# Patient Record
Sex: MALE | Race: WHITE | Employment: FULL TIME | URBAN - METROPOLITAN AREA
[De-identification: names, ages, dates, MRNs, and addresses within clinical notes are randomized per-mention and may not be internally consistent; named-entity substitution may affect disease eponyms.]

---

## 2017-04-20 ENCOUNTER — HOSPITAL ENCOUNTER (OUTPATIENT)
Dept: OTHER | Age: 42
Discharge: HOME | End: 2017-04-20
Attending: PREVENTIVE MEDICINE | Admitting: PREVENTIVE MEDICINE

## 2019-01-20 ENCOUNTER — APPOINTMENT (OUTPATIENT)
Dept: GENERAL RADIOLOGY | Age: 44
End: 2019-01-20
Payer: COMMERCIAL

## 2019-01-20 ENCOUNTER — HOSPITAL ENCOUNTER (EMERGENCY)
Age: 44
Discharge: HOME | End: 2019-01-20
Payer: COMMERCIAL

## 2019-01-20 VITALS
TEMPERATURE: 98 F | HEART RATE: 95 BPM | DIASTOLIC BLOOD PRESSURE: 90 MMHG | SYSTOLIC BLOOD PRESSURE: 153 MMHG | OXYGEN SATURATION: 99 % | RESPIRATION RATE: 14 BRPM

## 2019-01-20 DIAGNOSIS — S49.92XA INJURY OF LEFT UPPER EXTREMITY, INITIAL ENCOUNTER: Primary | ICD-10-CM

## 2019-01-20 PROCEDURE — 73060 X-RAY EXAM OF HUMERUS: CPT

## 2019-01-20 PROCEDURE — 99283 EMERGENCY DEPT VISIT LOW MDM: CPT

## 2019-01-20 RX ORDER — NAPROXEN 500 MG/1
500 TABLET ORAL 2 TIMES DAILY PRN
Qty: 20 TABLET | Refills: 0 | Status: SHIPPED | OUTPATIENT
Start: 2019-01-20 | End: 2022-09-11

## 2019-01-20 ASSESSMENT — PAIN SCALES - GENERAL: PAINLEVEL_OUTOF10: 4

## 2020-01-02 ENCOUNTER — HOSPITAL ENCOUNTER (EMERGENCY)
Age: 45
Discharge: HOME | End: 2020-01-02

## 2020-01-02 VITALS
TEMPERATURE: 98.2 F | DIASTOLIC BLOOD PRESSURE: 82 MMHG | BODY MASS INDEX: 23.7 KG/M2 | SYSTOLIC BLOOD PRESSURE: 139 MMHG | OXYGEN SATURATION: 97 % | HEART RATE: 104 BPM | HEIGHT: 69 IN | RESPIRATION RATE: 16 BRPM | WEIGHT: 160 LBS

## 2020-01-02 PROCEDURE — 99282 EMERGENCY DEPT VISIT SF MDM: CPT

## 2020-01-02 PROCEDURE — 90471 IMMUNIZATION ADMIN: CPT | Performed by: PHYSICIAN ASSISTANT

## 2020-01-02 PROCEDURE — 4500000027

## 2020-01-02 PROCEDURE — 90715 TDAP VACCINE 7 YRS/> IM: CPT | Performed by: PHYSICIAN ASSISTANT

## 2020-01-02 PROCEDURE — 6360000002 HC RX W HCPCS: Performed by: PHYSICIAN ASSISTANT

## 2020-01-02 RX ORDER — CEPHALEXIN 500 MG/1
500 CAPSULE ORAL 4 TIMES DAILY
Qty: 20 CAPSULE | Refills: 0 | Status: SHIPPED | OUTPATIENT
Start: 2020-01-02 | End: 2020-01-07

## 2020-01-02 RX ADMIN — TETANUS TOXOID, REDUCED DIPHTHERIA TOXOID AND ACELLULAR PERTUSSIS VACCINE, ADSORBED 0.5 ML: 5; 2.5; 8; 8; 2.5 SUSPENSION INTRAMUSCULAR at 18:49

## 2020-01-02 ASSESSMENT — PAIN DESCRIPTION - ORIENTATION: ORIENTATION: RIGHT

## 2020-01-02 ASSESSMENT — PAIN SCALES - GENERAL: PAINLEVEL_OUTOF10: 7

## 2020-01-02 ASSESSMENT — PAIN DESCRIPTION - DESCRIPTORS: DESCRIPTORS: THROBBING

## 2020-01-02 ASSESSMENT — PAIN DESCRIPTION - PAIN TYPE: TYPE: ACUTE PAIN

## 2020-01-02 ASSESSMENT — PAIN DESCRIPTION - LOCATION: LOCATION: FINGER (COMMENT WHICH ONE)

## 2020-01-02 NOTE — ED PROVIDER NOTES
EMERGENCY DEPARTMENT ENCOUNTER        PCP: No primary care provider on file. CHIEF COMPLAINT    Chief Complaint   Patient presents with    Laceration         This patient was not evaluated by the attending physician. I have independently evaluated this patient . HPI    Benton Le is a 38 y.o. male who presents with laceration to right thumb. Onset 1 hour prior to arrival.  Context is patient was cutting zip tie off a metal object when accidentally contacted dorsum of right thumb with sharp knife. There is associated pain and bleeding. Bleeding has stopped and pain does not radiate. No known aggravating or alleviating factors. No distal numbness, tingling, weakness, functional/motor deficit. Pt denies foreign body sensation. Unknown tetanus status. REVIEW OF SYSTEMS    General:   Denies symptoms preceding injury. Skin: + Laceration. SEE HPI  Musculoskeletal:  No distal numbness, tingling. No obvious tendon or motor deficits. Denies any other musculoskeletal injuries or skin trauma. All other review of systems are negative  See HPI and nursing notes for additional information     PAST MEDICAL & SURGICAL HISTORY    Past Medical History:   Diagnosis Date    Seasonal allergies      Past Surgical History:   Procedure Laterality Date    ARM SURGERY Left     distal bicep tear       CURRENT MEDICATIONS    Current Outpatient Rx   Medication Sig Dispense Refill    cephALEXin (KEFLEX) 500 MG capsule Take 1 capsule by mouth 4 times daily for 5 days 20 capsule 0    naproxen (NAPROSYN) 500 MG tablet Take 1 tablet by mouth 2 times daily as needed for Pain 20 tablet 0    traMADol (ULTRAM) 50 MG tablet Take 1 tablet by mouth every 6 hours as needed for Pain 15 tablet 0    methylPREDNISolone (MEDROL, YOJANA,) 4 MG tablet Medrol Dose yojana - Use as directed. #1 pack. (No refills) 1 kit 0    ketoconazole (NIZORAL) 2 % shampoo Apply topically daily as needed.  1 Bottle 1       ALLERGIES pool: JAYLENE Leblanc Allergies   Allergen Reactions    Pollen Extract     Tobacco        SOCIAL & FAMILY HISTORY    Social History     Socioeconomic History    Marital status: Single     Spouse name: None    Number of children: None    Years of education: None    Highest education level: None   Occupational History    None   Social Needs    Financial resource strain: None    Food insecurity:     Worry: None     Inability: None    Transportation needs:     Medical: None     Non-medical: None   Tobacco Use    Smoking status: Current Every Day Smoker     Packs/day: 0.50     Types: Cigarettes    Smokeless tobacco: Never Used   Substance and Sexual Activity    Alcohol use: No    Drug use: No    Sexual activity: None   Lifestyle    Physical activity:     Days per week: None     Minutes per session: None    Stress: None   Relationships    Social connections:     Talks on phone: None     Gets together: None     Attends Judaism service: None     Active member of club or organization: None     Attends meetings of clubs or organizations: None     Relationship status: None    Intimate partner violence:     Fear of current or ex partner: None     Emotionally abused: None     Physically abused: None     Forced sexual activity: None   Other Topics Concern    None   Social History Narrative    None     History reviewed. No pertinent family history. PHYSICAL EXAM    VITAL SIGNS: /82   Pulse 104   Temp 98.2 °F (36.8 °C) (Oral)   Resp 16   Ht 5' 9\" (1.753 m)   Wt 160 lb (72.6 kg)   SpO2 97%   BMI 23.63 kg/m²   Constitutional:  Well developed, Appears comfortable  HEENT:  Normocephalic, Atraumatic. PERRL, EOMI. Musculoskeletal:  No gross deformities. No motor deficits. Distal sensation and capillary refill intact. Vascular: Distal pulses and capillary refill intact. Integument:    Over the dorsum of the right thumb there is a laceration measuring approximately 2.2 centimeters in length.   This is flap type laceration   No obvious foreign body on initial inspection. No obvious tendon involvement    Distal sensation, capillary refill intact. Distal joints with full range of motion    See below for further details. Neurologic:  Awake and alert, normal flow of speech. CN 2-12 intact. Psychiatric: Cooperative, pleasant affect      ________________________________________________________________________       Procedure Note - Manuela Giraldo PA-C      Laceration Repair Procedure Note    Indication: Skin Laceration -right thumb    Procedure:   - Procedure explained, including risks and benefits explained to the patient who expressed understanding. All questions were answered. Verbal consent obtained. - The Wound was prepped and draped in the usual sterile fashion using Betadine and sterile saline.  - The wound is anesthetized using 2% lidocaine, approximately 1 ml  - Wound was explored to it's depth:    no foreign bodies. no compromise of neurovascular or tendon structures  - Wound was irrigated with copious amounts of sterile saline and mechanically debrided utilizing sterile gauze. - The laceration was Closed with 4-0 Prolene sutures, total number of 5,  simple interrupted and widely spaced to allow wound drainage and minimize risk of infection  - Hemostasis and good cosmesis was achieved. Blood loss minimal.  - The wound area was then dressed with Sterile nonstick dressing, sterile gauze, and tape. - Patient tolerated procedure well without complications. Post procedure exam of the affected region reveals distal sensation, motor, capillary refill, and pulses intact    Total repaired wound length: 0.2 cm  ________________________________________________________________________          ED COURSE & MEDICAL DECISION MAKING        I discussed possibility of infection, retained foreign body, tendon injury, nerve injury. Wound care instructions discussed with patient today.   Wound check in 2-3 days. Suture/Staple removal in 7 days. (discussed today). Patient placed in aluminum splint today to prevent flexion of thumb and subsequent tearing out of sutures. Return to emergency Department precautions were discussed in detail with patient who understands and agrees. Clinical  IMPRESSION    1. Laceration of right thumb without foreign body without damage to nail, initial encounter              Comment: Please note this report has been produced using speech recognition software and may contain errors related to that system including errors in grammar, punctuation, and spelling, as well as words and phrases that may be inappropriate. If there are any questions or concerns please feel free to contact the dictating provider for clarification.            Veronica Merrill  01/02/20 1955

## 2020-01-02 NOTE — DISCHARGE INSTR - COC
Continuity of Care Form    Patient Name: Vianca Collins   :  1975  MRN:  [id number]    Admit date:  (Not on file)  Discharge date:  ***    Code Status Order: No Order   Advance Directives:     Admitting Physician:  No admitting provider for patient encounter. PCP: No primary care provider on file. Discharging Nurse: [facility] Unit/Room#: No information available for this encounter. Discharging Unit Phone Number: ***    Emergency Contact:   Extended Emergency Contact Information  Primary Emergency Contact: Alejandra Vazquez  Address: [address] [place] Phone: [phone]  Relation: Parent    Past Surgical History:  Past Surgical History:   Procedure Laterality Date    ARM SURGERY Left     distal bicep tear       Immunization History: There is no immunization history on file for this patient. Active Problems: There is no problem list on file for this patient.       Isolation/Infection:   Isolation          No Isolation        Patient Infection Status     None to display          Nurse Assessment:  Last Vital Signs: /82   Pulse 104   Temp 98.2 °F (36.8 °C) (Oral)   Resp 16   Ht 5' 9\" (1.753 m)   Wt 160 lb (72.6 kg)   SpO2 97%   BMI 23.63 kg/m²     Last documented pain score (0-10 scale): Pain Level: 7  Last Weight:   Wt Readings from Last 1 Encounters:   20 160 lb (72.6 kg)     Mental Status:  {IP PT MENTAL STATUS:}    IV Access:  { CHARITO IV ACCESS:455751888}    Nursing Mobility/ADLs:  Walking   {CHP DME BCKW:285908539}  Transfer  {CHP DME VPDJ:988694142}  Bathing  {CHP DME AYUA:113285807}  Dressing  {CHP DME XNCP:899176562}  Toileting  {CHP DME FLJL:436952135}  Feeding  {CHP DME QXHY:357881890}  Med Admin  {CHP DME TPDS:590561574}  Med Delivery   { CHARITO MED Delivery:326620685}    Wound Care Documentation and Therapy:        Elimination:  Continence:   · Bowel: {YES / RC:06279}  · Bladder: {YES / DX:05218}  Urinary Catheter: {Urinary Catheter:159781412}   Colostomy/Ileostomy/Ileal Conduit: {YES / LA:76641}       Date of Last BM: ***  No intake or output data in the 24 hours ending 20 1705  No intake/output data recorded.     Safety Concerns:     [address] Safety Concerns:389405999}    Impairments/Disabilities:      [address] [facility] Impairments/Disabilities:686444135}    Nutrition Therapy:  Current Nutrition Therapy:   [address] [facility] Diet List:864863109}    Routes of Feeding: {CHP DME Other Feedings:762011505}  Liquids: {Slp liquid thickness:77528}  Daily Fluid Restriction: {CHP DME Yes amt example:631221740}  Last Modified Barium Swallow with Video (Video Swallowing Test): {Done Not Done OHLU:835301837}    Treatments at the Time of Hospital Discharge:   Respiratory Treatments: ***  Oxygen Therapy:  {Therapy; copd oxygen:64545}  Ventilator:    { CC Vent MNRU:267854266}    Rehab Therapies: {THERAPEUTIC INTERVENTION:0839218103}  Weight Bearing Status/Restrictions: [address]  Weight Bearin}  Other Medical Equipment (for information only, NOT a DME order):  {EQUIPMENT:329380160}  Other Treatments: ***    Patient's personal belongings (please select all that are sent with patient):  {CHP DME Belongings:158212425}    RN SIGNATURE:  {Esignature:082557824}    CASE MANAGEMENT/SOCIAL WORK SECTION    Inpatient Status Date: ***    Readmission Risk Assessment Score:  Readmission Risk              Risk of Unplanned Readmission:        0           Discharging to Facility/ Agency   · Name:   · Address:  · Phone:  · Fax:    Dialysis Facility (if applicable)   · Name:  · Address:  · Dialysis Schedule:  · Phone:  · Fax:    / signature: {Esignature:658030790}    PHYSICIAN SECTION    Prognosis: {Prognosis:0700182836}    Condition at Discharge: [address] Patient Condition:864438977}    Rehab Potential (if transferring to Rehab): {Prognosis:9866163608}    Recommended Labs or Other Treatments After Discharge:

## 2021-10-18 ENCOUNTER — HOSPITAL ENCOUNTER (EMERGENCY)
Age: 46
Discharge: HOME | End: 2021-10-18

## 2021-10-18 VITALS
OXYGEN SATURATION: 99 % | SYSTOLIC BLOOD PRESSURE: 140 MMHG | RESPIRATION RATE: 17 BRPM | DIASTOLIC BLOOD PRESSURE: 95 MMHG | TEMPERATURE: 98.7 F | WEIGHT: 160 LBS | HEIGHT: 69 IN | HEART RATE: 85 BPM | BODY MASS INDEX: 23.7 KG/M2

## 2021-10-18 DIAGNOSIS — J06.9 VIRAL UPPER RESPIRATORY TRACT INFECTION: Primary | ICD-10-CM

## 2021-10-18 PROCEDURE — U0003 INFECTIOUS AGENT DETECTION BY NUCLEIC ACID (DNA OR RNA); SEVERE ACUTE RESPIRATORY SYNDROME CORONAVIRUS 2 (SARS-COV-2) (CORONAVIRUS DISEASE [COVID-19]), AMPLIFIED PROBE TECHNIQUE, MAKING USE OF HIGH THROUGHPUT TECHNOLOGIES AS DESCRIBED BY CMS-2020-01-R: HCPCS

## 2021-10-18 PROCEDURE — U0005 INFEC AGEN DETEC AMPLI PROBE: HCPCS

## 2021-10-18 PROCEDURE — 99283 EMERGENCY DEPT VISIT LOW MDM: CPT | Performed by: NURSE PRACTITIONER

## 2021-10-18 RX ORDER — GUAIFENESIN 600 MG/1
600 TABLET, EXTENDED RELEASE ORAL 2 TIMES DAILY
Qty: 14 TABLET | Refills: 0 | Status: SHIPPED | OUTPATIENT
Start: 2021-10-18 | End: 2021-10-25

## 2021-10-18 ASSESSMENT — PAIN SCALES - GENERAL
PAINLEVEL_OUTOF10: 3
PAINLEVEL_OUTOF10: 9

## 2021-10-18 ASSESSMENT — PAIN DESCRIPTION - PAIN TYPE
TYPE: ACUTE PAIN
TYPE: ACUTE PAIN

## 2021-10-18 ASSESSMENT — PAIN DESCRIPTION - LOCATION: LOCATION: NECK

## 2021-10-18 ASSESSMENT — PAIN DESCRIPTION - DESCRIPTORS: DESCRIPTORS: ACHING

## 2021-10-18 NOTE — ED PROVIDER NOTES
EMERGENCY DEPARTMENT ENCOUNTER      PCP: No primary care provider on file. CHIEF COMPLAINT    Chief Complaint   Patient presents with    Nasal Congestion         This patient was not evaluated by the attending physician. I have independently evaluated this patient . HPI    Monroe Breen is a 40 y.o. male who presents with complaints of nasal congestion, headache, and body aches for the past couple of days. Patient states initially he thought it was allergies, however he has been taking Benadryl with no relief. He complains of last headache and body aches he rates as 9/10, aching, and constant. He states he is a lot of sinus drainage and pressure. Nothing has alleviated symptoms, leaning forward exacerbates his symptoms. He denies any cough, shortness of breath, chest pain, nausea, vomiting, chest pain, or other complaints. REVIEW OF SYSTEMS    Constitutional:  Denies fever, chills  HEENT:  See above  Cardiovascular:  Denies chest pain, palpitations.   Respiratory:  Denies shortness of breath or wheezes  GI:  Denies abdominal pain, vomiting, or diarrhea   Neurologic:  Denies confusion, light headedness, dizziness, or syncope   Skin:  No rash    All other review of systems are negative  See HPI and nursing notes for additional information       PAST MEDICAL AND SURGICAL HISTORY    Past Medical History:   Diagnosis Date    Seasonal allergies      Past Surgical History:   Procedure Laterality Date    ARM SURGERY Left     distal bicep tear       CURRENT MEDICATIONS    Current Outpatient Rx   Medication Sig Dispense Refill    Pseudoephedrine-DM-GG 60- MG TABS Take 1 tablet by mouth every 6 hours as needed (congestion) 56 tablet 0    guaiFENesin (MUCINEX) 600 MG extended release tablet Take 1 tablet by mouth 2 times daily for 7 days 14 tablet 0    naproxen (NAPROSYN) 500 MG tablet Take 1 tablet by mouth 2 times daily as needed for Pain 20 tablet 0    traMADol (ULTRAM) 50 MG tablet Take 1 tablet by mouth every 6 hours as needed for Pain 15 tablet 0    methylPREDNISolone (MEDROL, YOJANA,) 4 MG tablet Medrol Dose yojana - Use as directed. #1 pack. (No refills) 1 kit 0    ketoconazole (NIZORAL) 2 % shampoo Apply topically daily as needed. 1 Bottle 1       ALLERGIES    Allergies   Allergen Reactions    Pollen Extract     Tobacco        FAMILY AND SOCIAL HISTORY    History reviewed. No pertinent family history. Social History     Socioeconomic History    Marital status: Single     Spouse name: None    Number of children: None    Years of education: None    Highest education level: None   Occupational History    None   Tobacco Use    Smoking status: Current Every Day Smoker     Packs/day: 0.50     Types: Cigarettes    Smokeless tobacco: Never Used   Vaping Use    Vaping Use: Never used   Substance and Sexual Activity    Alcohol use: No    Drug use: No    Sexual activity: None   Other Topics Concern    None   Social History Narrative    None     Social Determinants of Health     Financial Resource Strain:     Difficulty of Paying Living Expenses:    Food Insecurity:     Worried About Running Out of Food in the Last Year:     Ran Out of Food in the Last Year:    Transportation Needs:     Lack of Transportation (Medical):      Lack of Transportation (Non-Medical):    Physical Activity:     Days of Exercise per Week:     Minutes of Exercise per Session:    Stress:     Feeling of Stress :    Social Connections:     Frequency of Communication with Friends and Family:     Frequency of Social Gatherings with Friends and Family:     Attends Roman [employer] Services:     Active Member of Clubs or Organizations:     Attends Club or Organization Meetings:     Marital Status:    Intimate Partner Violence:     Fear of Current or Ex-Partner:     Emotionally Abused:     Physically Abused:     Sexually Abused:        PHYSICAL EXAM    VITAL SIGNS: BP (!) 126/102   Pulse 108   Temp 98.8 °F (37.1 °C) (Oral)   Resp 20   Ht 5' 9\" (1.753 m)   Wt 160 lb (72.6 kg)   SpO2 100%   BMI 23.63 kg/m²   Constitutional:  Well developed, well nourished, no acute distress, non-toxic appearance   Eyes: Conjunctiva normal, sclera non-icteric  HEENT:     - Normocephalic, atraumatic   - PERRL, EOM intact. Conjunctiva normal    -  Frontal/Maxillary sinuses NONtender to percussion.   - External auditory canals  clear   - Nasal passages with mildly erythematous and edematous turbinates. No massess.   - Oropharynx mildly erythematous without tonsillar hypertrophy or exudate. Neck/Lymphatics:    - supple, no JVD, no swollen nodes     Respiratory:     Nonlabored breathing - No retractions, no accessory muscle use   Clear to auscultation bilateral lung fields, no wheezes/rales/rhonchi. Cardiovascular:  Normal rate, normal rhythm   GI:  Soft, no abdominal tenderness, no guarding. Musculoskeletal:  No obvious deficits, no edema   Integument:  Skin pink, warm, dry, intact           ED COURSE & MEDICAL DECISION MAKING        60-year-old male presents emergency department with complaints of nasal congestion, sinus pressure and pain, generalized headache, and body aches for the past couple of days. Covid test obtained and pending. I discussed the likely viral etiology of patient's symptoms with Pt today and recommend symptomatic treatment with increase fluids, rest.  I provided symptomatic treatment. Given a prescription for Mucinex. Instructed to quarantine until he is the result of his Covid test.  If positive, he was instructed to quarantine for at least 10 days. If feeling better at 10 days he may come out of quarantine. Instructed to follow-up with a primary care provider within a week and have them recheck his blood pressure was elevated during today's stay. I recommend followup with primary care provider in 2-3 days for recheck.      Patient agrees to return emergency department if symptoms worsen or any new symptoms develop. Vital signs and nursing notes reviewed during ED course. Differential Diagnoses:  Acute Bronchitis, Pneumonia, Airway Obstruction, Upper Respiratory Viral infection, Sinusitis, Pharyngitis, [place]-Tonsillar Abscess,        Clinical  IMPRESSION    1. Viral upper respiratory tract infection          Comment: Please note this report has been produced using speech recognition software and may contain errors related to that system including errors in grammar, punctuation, and spelling, as well as words and phrases that may be inappropriate. If there are any questions or concerns please feel free to contact the dictating provider for clarification.       CAL Fink - CNP  10/18/21 2001

## 2021-10-18 NOTE — Clinical Note
Norma Peace was seen and treated in our emergency department on 10/18/2021. He may return to work on 10/21/2021. Or until results of Covid test.  If positive, the patient is not able to return to work until 10 days after onset of symptoms if symptoms improving. If you have any questions or concerns, please don't hesitate to call.       Addis Peoples, CAL - CNP

## 2021-10-19 ENCOUNTER — CARE COORDINATION (OUTPATIENT)
Dept: CARE COORDINATION | Age: 46
End: 2021-10-19

## 2021-10-19 LAB
SARS-COV-2: NOT DETECTED
SOURCE: NORMAL

## 2021-10-19 NOTE — CARE COORDINATION
Attempted to reach patient for [facility] ER follow up. No answer to phone. Message left with ACM contact information and request for call back. COVID-19 :  Not detected. No further follow up planned.

## 2022-09-11 ENCOUNTER — HOSPITAL ENCOUNTER (EMERGENCY)
Age: 47
Discharge: HOME | End: 2022-09-11

## 2022-09-11 ENCOUNTER — APPOINTMENT (OUTPATIENT)
Dept: GENERAL RADIOLOGY | Age: 47
End: 2022-09-11

## 2022-09-11 VITALS
DIASTOLIC BLOOD PRESSURE: 96 MMHG | OXYGEN SATURATION: 100 % | HEIGHT: 69 IN | HEART RATE: 88 BPM | SYSTOLIC BLOOD PRESSURE: 134 MMHG | WEIGHT: 160 LBS | RESPIRATION RATE: 18 BRPM | TEMPERATURE: 98 F | BODY MASS INDEX: 23.7 KG/M2

## 2022-09-11 DIAGNOSIS — R09.89 GLOBUS SENSATION: ICD-10-CM

## 2022-09-11 DIAGNOSIS — J02.9 EXUDATIVE PHARYNGITIS: Primary | ICD-10-CM

## 2022-09-11 DIAGNOSIS — R53.83 FATIGUE, UNSPECIFIED TYPE: ICD-10-CM

## 2022-09-11 LAB
ALBUMIN SERPL-MCNC: 4.2 GM/DL (ref 3.4–5)
ALP BLD-CCNC: 92 IU/L (ref 40–129)
ALT SERPL-CCNC: 15 U/L (ref 10–40)
ANION GAP SERPL CALCULATED.3IONS-SCNC: 7 MMOL/L (ref 4–16)
AST SERPL-CCNC: 21 IU/L (ref 15–37)
BACTERIA: NEGATIVE /HPF
BASOPHILS ABSOLUTE: 0.1 K/CU MM
BASOPHILS RELATIVE PERCENT: 1 % (ref 0–1)
BILIRUB SERPL-MCNC: 1.1 MG/DL (ref 0–1)
BILIRUBIN URINE: NEGATIVE MG/DL
BLOOD, URINE: NEGATIVE
BUN BLDV-MCNC: 13 MG/DL (ref 6–23)
CALCIUM SERPL-MCNC: 8.8 MG/DL (ref 8.3–10.6)
CHLORIDE BLD-SCNC: 104 MMOL/L (ref 99–110)
CLARITY: CLEAR
CO2: 28 MMOL/L (ref 21–32)
COLOR: YELLOW
CREAT SERPL-MCNC: 0.8 MG/DL (ref 0.9–1.3)
DIFFERENTIAL TYPE: ABNORMAL
EOSINOPHILS ABSOLUTE: 0.3 K/CU MM
EOSINOPHILS RELATIVE PERCENT: 2.5 % (ref 0–3)
GFR AFRICAN AMERICAN: >60 ML/MIN/1.73M2
GFR NON-AFRICAN AMERICAN: >60 ML/MIN/1.73M2
GLUCOSE BLD-MCNC: 100 MG/DL
GLUCOSE BLD-MCNC: 100 MG/DL (ref 70–99)
GLUCOSE BLD-MCNC: 110 MG/DL (ref 70–99)
GLUCOSE, URINE: NEGATIVE MG/DL
HCT VFR BLD CALC: 42.8 % (ref 42–52)
HEMOGLOBIN: 14 GM/DL (ref 13.5–18)
HETEROPHILE ANTIBODIES: NEGATIVE
IMMATURE NEUTROPHIL %: 0.4 % (ref 0–0.43)
KETONES, URINE: NEGATIVE MG/DL
LEUKOCYTE ESTERASE, URINE: NEGATIVE
LYMPHOCYTES ABSOLUTE: 1.6 K/CU MM
LYMPHOCYTES RELATIVE PERCENT: 13.1 % (ref 24–44)
MCH RBC QN AUTO: 31.7 PG (ref 27–31)
MCHC RBC AUTO-ENTMCNC: 32.7 % (ref 32–36)
MCV RBC AUTO: 96.8 FL (ref 78–100)
MONOCYTES ABSOLUTE: 0.9 K/CU MM
MONOCYTES RELATIVE PERCENT: 7.3 % (ref 0–4)
MUCUS: ABNORMAL HPF
NITRITE URINE, QUANTITATIVE: NEGATIVE
NUCLEATED RBC %: 0 %
PDW BLD-RTO: 13.1 % (ref 11.7–14.9)
PH, URINE: 6 (ref 5–8)
PLATELET # BLD: 252 K/CU MM (ref 140–440)
PMV BLD AUTO: 10.1 FL (ref 7.5–11.1)
POTASSIUM SERPL-SCNC: 3.8 MMOL/L (ref 3.5–5.1)
PROTEIN UA: NEGATIVE MG/DL
RBC # BLD: 4.42 M/CU MM (ref 4.6–6.2)
RBC URINE: 2 /HPF (ref 0–3)
SARS-COV-2, NAAT: NOT DETECTED
SEGMENTED NEUTROPHILS ABSOLUTE COUNT: 9.3 K/CU MM
SEGMENTED NEUTROPHILS RELATIVE PERCENT: 75.7 % (ref 36–66)
SODIUM BLD-SCNC: 139 MMOL/L (ref 135–145)
SOURCE: NORMAL
SPECIFIC GRAVITY UA: >1.03 (ref 1–1.03)
SQUAMOUS EPITHELIAL: <1 /HPF
TOTAL IMMATURE NEUTOROPHIL: 0.05 K/CU MM
TOTAL NUCLEATED RBC: 0 K/CU MM
TOTAL PROTEIN: 6.4 GM/DL (ref 6.4–8.2)
TRICHOMONAS: ABNORMAL /HPF
TSH HIGH SENSITIVITY: 0.93 UIU/ML (ref 0.27–4.2)
UROBILINOGEN, URINE: 0.2 MG/DL (ref 0.2–1)
WBC # BLD: 12.3 K/CU MM (ref 4–10.5)
WBC UA: 1 /HPF (ref 0–2)

## 2022-09-11 PROCEDURE — 86318 IA INFECTIOUS AGENT ANTIBODY: CPT

## 2022-09-11 PROCEDURE — 96375 TX/PRO/DX INJ NEW DRUG ADDON: CPT

## 2022-09-11 PROCEDURE — 93005 ELECTROCARDIOGRAM TRACING: CPT | Performed by: EMERGENCY MEDICINE

## 2022-09-11 PROCEDURE — 6370000000 HC RX 637 (ALT 250 FOR IP): Performed by: PHYSICIAN ASSISTANT

## 2022-09-11 PROCEDURE — 87635 SARS-COV-2 COVID-19 AMP PRB: CPT

## 2022-09-11 PROCEDURE — 82962 GLUCOSE BLOOD TEST: CPT

## 2022-09-11 PROCEDURE — 81001 URINALYSIS AUTO W/SCOPE: CPT

## 2022-09-11 PROCEDURE — 87077 CULTURE AEROBIC IDENTIFY: CPT

## 2022-09-11 PROCEDURE — 84443 ASSAY THYROID STIM HORMONE: CPT

## 2022-09-11 PROCEDURE — 2500000003 HC RX 250 WO HCPCS: Performed by: PHYSICIAN ASSISTANT

## 2022-09-11 PROCEDURE — 71045 X-RAY EXAM CHEST 1 VIEW: CPT

## 2022-09-11 PROCEDURE — 85025 COMPLETE CBC W/AUTO DIFF WBC: CPT

## 2022-09-11 PROCEDURE — 99285 EMERGENCY DEPT VISIT HI MDM: CPT

## 2022-09-11 PROCEDURE — 87430 STREP A AG IA: CPT

## 2022-09-11 PROCEDURE — 96374 THER/PROPH/DIAG INJ IV PUSH: CPT

## 2022-09-11 PROCEDURE — 70360 X-RAY EXAM OF NECK: CPT

## 2022-09-11 PROCEDURE — A4216 STERILE WATER/SALINE, 10 ML: HCPCS | Performed by: PHYSICIAN ASSISTANT

## 2022-09-11 PROCEDURE — 80053 COMPREHEN METABOLIC PANEL: CPT

## 2022-09-11 PROCEDURE — 6360000002 HC RX W HCPCS: Performed by: PHYSICIAN ASSISTANT

## 2022-09-11 PROCEDURE — 2580000003 HC RX 258: Performed by: PHYSICIAN ASSISTANT

## 2022-09-11 PROCEDURE — 87081 CULTURE SCREEN ONLY: CPT

## 2022-09-11 RX ORDER — 0.9 % SODIUM CHLORIDE 0.9 %
1000 INTRAVENOUS SOLUTION INTRAVENOUS ONCE
Status: COMPLETED | OUTPATIENT
Start: 2022-09-11 | End: 2022-09-11

## 2022-09-11 RX ORDER — DEXAMETHASONE SODIUM PHOSPHATE 10 MG/ML
8 INJECTION, SOLUTION INTRAMUSCULAR; INTRAVENOUS ONCE
Status: COMPLETED | OUTPATIENT
Start: 2022-09-11 | End: 2022-09-11

## 2022-09-11 RX ORDER — KETOROLAC TROMETHAMINE 30 MG/ML
30 INJECTION, SOLUTION INTRAMUSCULAR; INTRAVENOUS ONCE
Status: COMPLETED | OUTPATIENT
Start: 2022-09-11 | End: 2022-09-11

## 2022-09-11 RX ORDER — LIDOCAINE HYDROCHLORIDE 20 MG/ML
15 SOLUTION OROPHARYNGEAL ONCE
Status: COMPLETED | OUTPATIENT
Start: 2022-09-11 | End: 2022-09-11

## 2022-09-11 RX ORDER — NAPROXEN 500 MG/1
500 TABLET ORAL 2 TIMES DAILY PRN
Qty: 20 TABLET | Refills: 0 | Status: SHIPPED | OUTPATIENT
Start: 2022-09-11 | End: 2022-09-21

## 2022-09-11 RX ORDER — BENZOCAINE AND MENTHOL, UNSPECIFIED FORM 15; 2.3 MG/1; MG/1
1 LOZENGE ORAL
Qty: 30 LOZENGE | Refills: 0 | Status: SHIPPED | OUTPATIENT
Start: 2022-09-11

## 2022-09-11 RX ADMIN — DEXAMETHASONE SODIUM PHOSPHATE 8 MG: 10 INJECTION, SOLUTION INTRAMUSCULAR; INTRAVENOUS at 20:57

## 2022-09-11 RX ADMIN — SODIUM CHLORIDE 1000 ML: 9 INJECTION, SOLUTION INTRAVENOUS at 17:57

## 2022-09-11 RX ADMIN — FAMOTIDINE 20 MG: 10 INJECTION, SOLUTION INTRAVENOUS at 17:56

## 2022-09-11 RX ADMIN — Medication 15 ML: at 17:57

## 2022-09-11 RX ADMIN — KETOROLAC TROMETHAMINE 30 MG: 30 INJECTION, SOLUTION INTRAMUSCULAR; INTRAVENOUS at 17:57

## 2022-09-11 ASSESSMENT — PAIN DESCRIPTION - PAIN TYPE: TYPE: ACUTE PAIN

## 2022-09-11 ASSESSMENT — PAIN SCALES - GENERAL
PAINLEVEL_OUTOF10: 3
PAINLEVEL_OUTOF10: 10

## 2022-09-11 ASSESSMENT — PAIN DESCRIPTION - FREQUENCY: FREQUENCY: INTERMITTENT

## 2022-09-11 ASSESSMENT — PAIN DESCRIPTION - LOCATION: LOCATION: BACK

## 2022-09-11 ASSESSMENT — PAIN DESCRIPTION - ONSET: ONSET: PROGRESSIVE

## 2022-09-11 NOTE — ED PROVIDER NOTES
bicep tear       CURRENT MEDICATIONS    Current Outpatient Rx   Medication Sig Dispense Refill    Benzocaine-Menthol (CEPACOL) 15-2.3 MG LOZG Take 1 lozenge by mouth every 3 hours as needed (pain) 30 lozenge 0    naproxen (NAPROSYN) 500 MG tablet Take 1 tablet by mouth 2 times daily as needed for Pain 20 tablet 0    traMADol (ULTRAM) 50 MG tablet Take 1 tablet by mouth every 6 hours as needed for Pain 15 tablet 0    methylPREDNISolone (MEDROL, YOJANA,) 4 MG tablet Medrol Dose yojana - Use as directed. #1 pack. (No refills) 1 kit 0    ketoconazole (NIZORAL) 2 % shampoo Apply topically daily as needed. 1 Bottle 1       ALLERGIES    Allergies   Allergen Reactions    Pollen Extract     Tobacco        FAMILY AND SOCIAL HISTORY    History reviewed. No pertinent family history. Social History     Socioeconomic History    Marital status: Single     Spouse name: None    Number of children: None    Years of education: None    Highest education level: None   Tobacco Use    Smoking status: Every Day     Packs/day: 0.50     Types: Cigarettes    Smokeless tobacco: Never   Vaping Use    Vaping Use: Never used   Substance and Sexual Activity    Alcohol use: No    Drug use: No       PHYSICAL EXAM    VITAL SIGNS: BP (!) 134/96   Pulse 88   Temp 98 °F (36.7 °C)   Resp 18   Ht 5' 9\" (1.753 m)   Wt 160 lb (72.6 kg)   SpO2 100%   BMI 23.63 kg/m²   Constitutional:  Well developed, well nourished, no acute distress, non-toxic appearance   Eyes:    -PERRL, EOM intact. Conjunctiva normal, sclera non-icteric  HEENT:     - Normocephalic, atraumatic     -  Frontal/Maxillary sinuses NONtender to percussion.   -Normal external ears without tenderness on pinna/tragus manipulation. No mastoid tenderness   - Oropharynx is patent, mildly injected, slightly edematous but midline uvula. Noted white exudates to the base of the uvula as well as tongue surface without tonsillar hypertrophy. No trismus. Tolerating secretions. No stridor.   No hot potato voice. No sublingual or submandibular swelling or jaw tenderness. Neck/Lymphatics:    - supple, no JVD, no swollen nodes   -Trachea is midline. No nuchal rigidity. No tenderness to palpation or fullness to the anterior neck, no palpable nodules to the thyroid gland. Respiratory:  Non labored breathing. Clear to auscultation bilateral lung fields, no wheezes/rales/rhonchi. No retractions, no accessory muscle use  Cardiovascular:  Normal rate, normal rhythm   GI:  Soft, no abdominal tenderness, no guarding. Musculoskeletal:  No obvious deficits, no edema   Integument:  Skin pink, warn, dry, intact.  No rash      LABS:  Results for orders placed or performed during the hospital encounter of 09/11/22   COVID-19, Rapid    Specimen: Nasopharyngeal   Result Value Ref Range    Source THROAT     SARS-CoV-2, NAAT NOT DETECTED NOT DETECTED   Strep Screen Group A Throat    Specimen: Throat   Result Value Ref Range    Specimen THROAT     Special Requests NONE     Strep A Direct Screen NEGATIVE    CBC with Auto Differential   Result Value Ref Range    WBC 12.3 (H) 4.0 - 10.5 K/CU MM    RBC 4.42 (L) 4.6 - 6.2 M/CU MM    Hemoglobin 14.0 13.5 - 18.0 GM/DL    Hematocrit 42.8 42 - 52 %    MCV 96.8 78 - 100 FL    MCH 31.7 (H) 27 - 31 PG    MCHC 32.7 32.0 - 36.0 %    RDW 13.1 11.7 - 14.9 %    Platelets 619 676 - 252 K/CU MM    MPV 10.1 7.5 - 11.1 FL    Differential Type AUTOMATED DIFFERENTIAL     Segs Relative 75.7 (H) 36 - 66 %    Lymphocytes % 13.1 (L) 24 - 44 %    Monocytes % 7.3 (H) 0 - 4 %    Eosinophils % 2.5 0 - 3 %    Basophils % 1.0 0 - 1 %    Segs Absolute 9.3 K/CU MM    Lymphocytes Absolute 1.6 K/CU MM    Monocytes Absolute 0.9 K/CU MM    Eosinophils Absolute 0.3 K/CU MM    Basophils Absolute 0.1 K/CU MM    Nucleated RBC % 0.0 %    Total Nucleated RBC 0.0 K/CU MM    Total Immature Neutrophil 0.05 K/CU MM    Immature Neutrophil % 0.4 0 - 0.43 %   Comprehensive Metabolic Panel   Result Value Ref Range    Sodium 139 135 - 145 MMOL/L    Potassium 3.8 3.5 - 5.1 MMOL/L    Chloride 104 99 - 110 mMol/L    CO2 28 21 - 32 MMOL/L    BUN 13 6 - 23 MG/DL    Creatinine 0.8 (L) 0.9 - 1.3 MG/DL    Glucose 110 (H) 70 - 99 MG/DL    Calcium 8.8 8.3 - 10.6 MG/DL    Albumin 4.2 3.4 - 5.0 GM/DL    Total Protein 6.4 6.4 - 8.2 GM/DL    Total Bilirubin 1.1 (H) 0.0 - 1.0 MG/DL    ALT 15 10 - 40 U/L    AST 21 15 - 37 IU/L    Alkaline Phosphatase 92 40 - 129 IU/L    GFR Non-African American >60 >60 mL/min/1.73m2    GFR African American >60 >60 mL/min/1.73m2    Anion Gap 7 4 - 16   TSH   Result Value Ref Range    TSH, High Sensitivity 0.931 0.270 - 4.20 uIu/ml   Urinalysis   Result Value Ref Range    Color, UA YELLOW YELLOW    Clarity, UA CLEAR CLEAR    Glucose, Urine NEGATIVE NEGATIVE MG/DL    Bilirubin Urine NEGATIVE NEGATIVE MG/DL    Ketones, Urine NEGATIVE NEGATIVE MG/DL    Specific Gravity, UA >1.030 1.001 - 1.035    Blood, Urine NEGATIVE NEGATIVE    pH, Urine 6.0 5.0 - 8.0    Protein, UA NEGATIVE NEGATIVE MG/DL    Urobilinogen, Urine 0.2 0.2 - 1.0 MG/DL    Nitrite Urine, Quantitative NEGATIVE NEGATIVE    Leukocyte Esterase, Urine NEGATIVE NEGATIVE    RBC, UA 2 0 - 3 /HPF    WBC, UA 1 0 - 2 /HPF    Bacteria, UA NEGATIVE NEGATIVE /HPF    Squam Epithel, UA <1 /HPF    Mucus, UA MANY (A) NEGATIVE HPF    Trichomonas, UA NONE SEEN NONE SEEN /HPF   Mononucleosis Screen   Result Value Ref Range    Monospot NEGATIVE NEGATIVE   POC Blood Glucose   Result Value Ref Range    Glucose 100 mg/dL   POCT Glucose   Result Value Ref Range    POC Glucose 100 (H) 70 - 99 MG/DL       RADIOLOGY/PROCEDURES        XR NECK SOFT TISSUE (Final result)  Result time 09/11/22 19:07:20  Final result by Arvin Dennison MD (09/11/22 19:07:20)                Impression:    1. Limited radiographs of the neck soft tissues due to obliquity on the   lateral view. No focal abnormality. 2. Chest demonstrates no acute abnormality.              Narrative:    EXAMINATION:   ONE XRAY VIEW OF THE CHEST; TWO XRAY VIEWS OF THE NECK SOFT TISSUES     9/11/2022 6:04 pm     COMPARISON:   Chest radiograph 2012. HISTORY:   ORDERING SYSTEM PROVIDED HISTORY: cough, fatigue   TECHNOLOGIST PROVIDED HISTORY:   Reason for exam:->cough, fatigue   Reason for Exam: cough fatique     FINDINGS:   Neck soft tissues: Two views provided. Limited exam as the lateral view due   is oblique. The neck soft tissues demonstrate normal symmetry. Normal   pharyngeal air column. Cervical spine degenerative changes with normal   alignment. Chest: Single view provided. The mediastinal, cardiac, and hilar silhouettes   are normal.  Normal lung volumes with no interstitial edema or acute   consolidation. No pulmonary mass or pneumothorax. No free subdiaphragmatic   air. XR CHEST PORTABLE (Final result)  Result time 09/11/22 19:07:20  Final result by Amandeep Jacobo MD (09/11/22 19:07:20)                Impression:    1. Limited radiographs of the neck soft tissues due to obliquity on the   lateral view. No focal abnormality. 2. Chest demonstrates no acute abnormality. Narrative:    EXAMINATION:   ONE XRAY VIEW OF THE CHEST; TWO XRAY VIEWS OF THE NECK SOFT TISSUES     9/11/2022 6:04 pm     COMPARISON:   Chest radiograph 2012. HISTORY:   ORDERING SYSTEM PROVIDED HISTORY: cough, fatigue   TECHNOLOGIST PROVIDED HISTORY:   Reason for exam:->cough, fatigue   Reason for Exam: cough fatique     FINDINGS:   Neck soft tissues: Two views provided. Limited exam as the lateral view due   is oblique. The neck soft tissues demonstrate normal symmetry. Normal   pharyngeal air column. Cervical spine degenerative changes with normal   alignment. Chest: Single view provided. The mediastinal, cardiac, and hilar silhouettes   are normal.  Normal lung volumes with no interstitial edema or acute   consolidation. No pulmonary mass or pneumothorax. No free subdiaphragmatic   air. EKG Interpretation  Please see ED physician's note - Dr. Freedom Ureña - for EKG interpretation    ED COURSE & MEDICAL DECISION MAKING       Vital signs and nursing notes reviewed during ED course. I have independently evaluated this patient . Supervising MD - Dr. Aneesh Schmidt - present in the Emergency Department, available for consultation, throughout entirety of  patient care. All pertinent Lab data and radiographic results reviewed with patient at bedside. The patient and/or the family were informed of the results of any tests/labs/imaging, the treatment plan, and time was allotted to answer questions. Differential Diagnoses:  Acute Bronchitis, Pneumonia, Airway Obstruction, Upper Respiratory Viral infection, Sinusitis, Pharyngitis, [place]-Tonsillar Abscess,    Clinical  IMPRESSION    1. Exudative pharyngitis    2. Fatigue, unspecified type    3. Globus sensation        Patient presents with generalized fatigue and globus sensation to the neck. On exam, pleasant nontoxic 52-year-old male, no acute distress. Protecting his airway and tolerating oral secretions. Was tachycardic in triage vital signs however his normal heart rate on my exam.  9 9% on room air. Trachea is midline. No cervical lymphadenopathy or trismus. Posterior pharynx is mildly injected with noted exudates at the base of the uvula as well as tongue surface without other tonsillar hypertrophy. No stridor. No hot potato voice. Neck is otherwise supple without signs of managements. No tenderness palpation of the soft tissue anterior neck or palpable thyroid nodules. Patient start IV fluids, Toradol, oral viscous lidocaine and Pepcid. CBC with mild leukocytosis 12.3 with left shift. Normal hemoglobin. CMP without significant derangement, Accu-Chek here is 100. Normal electrolytes. Only elevated bilirubin of 1.1 with otherwise normal LFTs and patient had no abdominal pain on exam.  Normal TSH.   Monospot, rapid COVID and strep are all negative. UA is unremarkable. Chest x-ray shows no evidence of acute cardiopulmonary process or consolidation. Neck soft tissue x-ray is slightly limited secondary to obliquity on the lateral view but no focal abnormality or evidence of airway narrowing. On reassessment, patient is feeling improvement of pain especially after oral viscous lidocaine. He does state that he feels like he is swallowing better after medications here and is less painful. At this time, discussed with patient likely viral etiology given the noted pharyngitis and generalized fatigue symptoms. Otherwise meets no criteria for empiric strep bacterial coverage and we discussed awaiting strep culture results prior to antibiotic coverage. Patient otherwise has no history of concern for HIV, immunocompromise state and he denies any usage of inhaled corticosteroids as they did consider possible esophageal candidiasis. We discussed outpatient follow-up with family doctor also referral to ENT should he continue her globus sensation as he may benefit from ENT evaluation and scope as he is a smoker and cannot completely rule out other soft tissue neck lesion not seen on exam or imaging today. However on the ED, patient is tolerating oral secretions, no evidence of stridor or nuchal rigidity. Low clinical suspicion for acute respiratory failure, meningitis/encephalitis, deep neck space infection, peritonsillar abscess/retropharyngeal abscess, epiglottitis. Patient is given additional IV Decadron in the ED and discharged home with Cepacol naproxen. Encourage rest, pushing clear fluids, warm salt water gargles. Patient does not have PCP so I have given him/her doctor of the day contact information and encourage him/her to establish care and followup in the next 1-2 days. Return warnings back to the ED discussed. Diagnosis and plan discussed in detail with patient who understands and agrees.   Patient agrees to return emergency department if symptoms worsen or any new symptoms develop. Comment: Please note this report has been produced using speech recognition software and may contain errors related to that system including errors in grammar, punctuation, and spelling, as well as words and phrases that may be inappropriate. If there are any questions or concerns please feel free to contact the dictating provider for clarification.        Meghan Portillo PA-C  09/11/22 2100

## 2022-09-11 NOTE — ACP (ADVANCE CARE PLANNING)
Patient does not have any ACP documents/Medical Power of . LSW notes hospital will follow [place]'s Next of Kin hierarchy in the following descending order for priority:    Guardian  Spouse  [de-identified] of adult Children  Parents  [de-identified] of adult Siblings  Nearest Relative not described above    Per [place] Next of Kin hierarchy: Patients' parent will be [address].

## 2022-09-12 LAB
EKG ATRIAL RATE: 99 BPM
EKG DIAGNOSIS: NORMAL
EKG P AXIS: 66 DEGREES
EKG P-R INTERVAL: 128 MS
EKG Q-T INTERVAL: 322 MS
EKG QRS DURATION: 76 MS
EKG QTC CALCULATION (BAZETT): 413 MS
EKG R AXIS: 82 DEGREES
EKG T AXIS: 67 DEGREES
EKG VENTRICULAR RATE: 99 BPM

## 2022-09-12 PROCEDURE — 93010 ELECTROCARDIOGRAM REPORT: CPT | Performed by: INTERNAL MEDICINE

## 2022-09-12 NOTE — ED NOTES
Pt drinking fluids and eating crackers, denies pain or difficulty swallowing.      Megha Martinez RN  09/11/22 2006

## 2022-09-15 LAB
CULTURE: ABNORMAL
CULTURE: ABNORMAL
Lab: ABNORMAL
SPECIMEN: ABNORMAL
STREP A DIRECT SCREEN: NEGATIVE

## 2022-09-19 ENCOUNTER — TELEPHONE (OUTPATIENT)
Dept: PHARMACY | Age: 47
End: 2022-09-19

## 2022-09-19 RX ORDER — AMOXICILLIN 500 MG/1
500 CAPSULE ORAL 2 TIMES DAILY
Qty: 20 CAPSULE | Refills: 0 | Status: SHIPPED | OUTPATIENT
Start: 2022-09-19 | End: 2022-09-29

## 2022-09-19 NOTE — PROGRESS NOTES
Pharmacy Note  ED Culture Follow-up    Lucia Cruz is a 56 y.o. male. Allergies: Pollen extract and Tobacco     Labs:  Lab Results   Component Value Date    BUN 13 09/11/2022    CREATININE 0.8 (L) 09/11/2022    WBC 12.3 (H) 09/11/2022     Estimated Creatinine Clearance: 115 mL/min (A) (based on SCr of 0.8 mg/dL (L)). Current antimicrobials:   None    ASSESSMENT:  Micro results:   Throat culture: positive for Beta strep Group C     PLAN:  Need for intervention: Yes  Discussed with: Dr. Le Channel treatment:    Patient reports still experiencing throat pain and \"lump in throat. \" Since patient symptomatic, will initiate antibiotic therapy with amoxicillin 500  mg by mouth twice daily for 10 days. Patient response:   Called and spoke with patient. Counseled patient on importance of completing entire antibiotic course, transmission and contagion periods, necessity of staying home until fever free on antibiotics for at least 24 hours, and sanitization. Called/sent in prescription to: [facility] DR GINETTE BOATENG    Please call with any questions.  KOURTNEY Hayden [facility], PharmD 12:52 PM 9/19/2022

## 2022-09-19 NOTE — TELEPHONE ENCOUNTER
Pharmacy Note  ED Culture Follow-up    Regianld Mcdonald is a 56 y.o. male. Allergies: Pollen extract and Tobacco     Labs:  Lab Results   Component Value Date    BUN 13 09/11/2022    CREATININE 0.8 (L) 09/11/2022    WBC 12.3 (H) 09/11/2022     Estimated Creatinine Clearance: 115 mL/min (A) (based on SCr of 0.8 mg/dL (L)). Current antimicrobials:   None    ASSESSMENT:  Micro results:   Throat culture: positive for Beta strep Group C     PLAN:  Need for intervention: Yes  Discussed with: Dr. Penny Ellington treatment:    Patient reports still experiencing throat pain and \"lump in throat. \" Since patient symptomatic, will initiate antibiotic therapy with amoxicillin 500  mg by mouth twice daily for 10 days. Patient response:   Called and spoke with patient. Counseled patient on importance of completing entire antibiotic course, transmission and contagion periods, necessity of staying home until fever free on antibiotics for at least 24 hours, and sanitization. Called/sent in prescription to: [facility]    Please call with any questions.  Herson Roberts [facility], PharmD 12:52 PM 9/19/2022

## 2023-10-13 ENCOUNTER — HOSPITAL ENCOUNTER (EMERGENCY)
Age: 48
Discharge: HOME | End: 2023-10-13

## 2023-10-13 VITALS
BODY MASS INDEX: 24.44 KG/M2 | SYSTOLIC BLOOD PRESSURE: 148 MMHG | HEART RATE: 99 BPM | HEIGHT: 69 IN | WEIGHT: 165 LBS | OXYGEN SATURATION: 96 % | TEMPERATURE: 98.2 F | DIASTOLIC BLOOD PRESSURE: 86 MMHG | RESPIRATION RATE: 17 BRPM

## 2023-10-13 DIAGNOSIS — S05.02XA ABRASION OF LEFT CORNEA, INITIAL ENCOUNTER: Primary | ICD-10-CM

## 2023-10-13 PROCEDURE — 99283 EMERGENCY DEPT VISIT LOW MDM: CPT

## 2023-10-13 PROCEDURE — 6370000000 HC RX 637 (ALT 250 FOR IP): Performed by: NURSE PRACTITIONER

## 2023-10-13 PROCEDURE — 2500000003 HC RX 250 WO HCPCS: Performed by: NURSE PRACTITIONER

## 2023-10-13 RX ORDER — PROPARACAINE HYDROCHLORIDE 5 MG/ML
1 SOLUTION/ DROPS OPHTHALMIC ONCE
Status: COMPLETED | OUTPATIENT
Start: 2023-10-13 | End: 2023-10-13

## 2023-10-13 RX ORDER — POLYMYXIN B SULFATE AND TRIMETHOPRIM 1; 10000 MG/ML; [USP'U]/ML
1 SOLUTION OPHTHALMIC EVERY 4 HOURS
Qty: 3 ML | Refills: 0
Start: 2023-10-13 | End: 2023-10-23

## 2023-10-13 RX ORDER — POLYMYXIN B SULFATE AND TRIMETHOPRIM 1; 10000 MG/ML; [USP'U]/ML
1 SOLUTION OPHTHALMIC ONCE
Status: COMPLETED | OUTPATIENT
Start: 2023-10-13 | End: 2023-10-13

## 2023-10-13 RX ORDER — BENZOCAINE AND MENTHOL, UNSPECIFIED FORM 15; 2.3 MG/1; MG/1
1 LOZENGE ORAL
Qty: 30 LOZENGE | Refills: 0 | Status: SHIPPED | OUTPATIENT
Start: 2023-10-13

## 2023-10-13 RX ADMIN — PROPARACAINE HYDROCHLORIDE 1 DROP: 5 SOLUTION/ DROPS OPHTHALMIC at 21:29

## 2023-10-13 RX ADMIN — POLYMYXIN B SULFATE AND TRIMETHOPRIM 1 DROP: 10000; 1 SOLUTION OPHTHALMIC at 21:27

## 2023-10-13 ASSESSMENT — VISUAL ACUITY: OU: 1

## 2023-10-13 ASSESSMENT — PAIN - FUNCTIONAL ASSESSMENT: PAIN_FUNCTIONAL_ASSESSMENT: 0-10

## 2023-10-13 ASSESSMENT — PAIN SCALES - GENERAL: PAINLEVEL_OUTOF10: 7

## 2023-10-14 NOTE — ED TRIAGE NOTES
Pt comes to ED with c/o foreign body in left eye. PT states that he believes it is either PVC pipe or exhaust from underneath a car. Pt states that the left eye is \"burning. \"

## 2023-10-14 NOTE — ED PROVIDER NOTES
[facility] Emergency Department    CHIEF COMPLAINT       Chief Complaint   Patient presents with    Foreign Body in Eye     left       Nurses Notes reviewed and I agree except as noted in the HPI. HISTORY OF PRESENT ILLNESS   Natacha Pham is a 53 y.o. male who presents to the ED for evaluation of body and eye. Patient notes foreign body sensation in his left eye, reports he was working on a muffler and vomiting today, he is unsure which got in his eye. He denies any visual loss. He notes this is happened before. He notes drainage from the eye. He denies any significant past medical history. HPI was provided by the patient. PAST MEDICAL HISTORY     Past Medical History:   Diagnosis Date    Seasonal allergies        SURGICALHISTORY      has a past surgical history that includes Arm Surgery (Left). CURRENT MEDICATIONS       Discharge Medication List as of 10/13/2023  9:24 PM        CONTINUE these medications which have NOT CHANGED    Details   naproxen (NAPROSYN) 500 MG tablet Take 1 tablet by mouth 2 times daily as needed for Pain, Disp-20 tablet, R-0Normal      traMADol (ULTRAM) 50 MG tablet Take 1 tablet by mouth every 6 hours as needed for Pain, Disp-15 tablet, R-0      methylPREDNISolone (MEDROL, DUDLEY,) 4 MG tablet Medrol Dose dudley - Use as directed. #1 pack. (No refills), Disp-1 kit, R-0      ketoconazole (NIZORAL) 2 % shampoo Apply topically daily as needed. , Disp-1 Bottle, R-1, Print             ALLERGIES     is allergic to pollen extract and tobacco.    FAMILY HISTORY     has no family status information on file. family history is not on file.     SOCIAL HISTORY       Social History     Socioeconomic History    Marital status: Single     Spouse name: Not on file    Number of children: Not on file    Years of education: Not on file    Highest education level: Not on file   Occupational History    Not on file   Tobacco Use    Smoking status: Every Day     Packs/day: .5 worsen      DISCHARGE MEDICATIONS:  Discharge Medication List as of 10/13/2023  9:24 PM        START taking these medications    Details   trimethoprim-polymyxin b (POLYTRIM) 52632-6.1 UNIT/ML-% ophthalmic solution Place 1 drop into the left eye every 4 hours for 10 days, Disp-3 mL, R-0NO PRINT             (Please note that portions of this note were completed with a voice recognition program.  Efforts were made to edit the dictations but occasionally words are mis-transcribed.)      Provider:  I personally performed the services described in the documentation,reviewed and edited the documentation which was dictated to the scribe in my presence, and it accurately records my words and actions.     Isidoro Estrada CNP 10/13/23 10:19 PM    Elbert Estrada, APRN - Elbert Almodovar APRN - MARTHA  10/13/23 2221

## 2025-04-21 ENCOUNTER — HOSPITAL ENCOUNTER (EMERGENCY)
Age: 50
Discharge: HOME | End: 2025-04-21
Payer: MEDICAID

## 2025-04-21 VITALS
BODY MASS INDEX: 25.99 KG/M2 | RESPIRATION RATE: 16 BRPM | TEMPERATURE: 98.3 F | SYSTOLIC BLOOD PRESSURE: 122 MMHG | DIASTOLIC BLOOD PRESSURE: 79 MMHG | WEIGHT: 176 LBS | OXYGEN SATURATION: 97 % | HEART RATE: 81 BPM

## 2025-04-21 DIAGNOSIS — H61.22 IMPACTED CERUMEN, LEFT EAR: ICD-10-CM

## 2025-04-21 DIAGNOSIS — J30.9 ALLERGIC RHINITIS, UNSPECIFIED SEASONALITY, UNSPECIFIED TRIGGER: Primary | ICD-10-CM

## 2025-04-21 PROCEDURE — 99213 OFFICE O/P EST LOW 20 MIN: CPT | Performed by: EMERGENCY MEDICINE

## 2025-04-21 PROCEDURE — 69209 REMOVE IMPACTED EAR WAX UNI: CPT

## 2025-04-21 PROCEDURE — 99213 OFFICE O/P EST LOW 20 MIN: CPT

## 2025-04-21 RX ORDER — LORATADINE PSEUDOEPHEDRINE SULFATE 10; 240 MG/1; MG/1
1 TABLET, EXTENDED RELEASE ORAL DAILY
Qty: 15 TABLET | Refills: 0 | Status: SHIPPED | OUTPATIENT
Start: 2025-04-21

## 2025-04-21 RX ORDER — FLUTICASONE PROPIONATE 50 MCG
1 SPRAY, SUSPENSION (ML) NASAL DAILY
Qty: 16 G | Refills: 0 | Status: SHIPPED | OUTPATIENT
Start: 2025-04-21

## 2025-04-21 ASSESSMENT — ENCOUNTER SYMPTOMS
COUGH: 1
RHINORRHEA: 1
SINUS PRESSURE: 1
WHEEZING: 0
SHORTNESS OF BREATH: 0

## 2025-04-21 ASSESSMENT — PAIN DESCRIPTION - ORIENTATION: ORIENTATION: POSTERIOR

## 2025-04-21 ASSESSMENT — PAIN DESCRIPTION - LOCATION: LOCATION: NECK;HEAD

## 2025-04-21 ASSESSMENT — PAIN DESCRIPTION - FREQUENCY: FREQUENCY: CONTINUOUS

## 2025-04-21 ASSESSMENT — PAIN - FUNCTIONAL ASSESSMENT
PAIN_FUNCTIONAL_ASSESSMENT: ACTIVITIES ARE NOT PREVENTED
PAIN_FUNCTIONAL_ASSESSMENT: 0-10

## 2025-04-21 NOTE — DISCHARGE INSTRUCTIONS
Claritin-D as prescribed    Flonase nasal spray daily    Tylenol/ibuprofen as needed for headache    Follow-up with family physician return if no significant improvement in additional 4 to 5 days.  Sooner if worse

## 2025-04-21 NOTE — ED TRIAGE NOTES
Alejandro arrives to room with complaint of  sinus congestion with pain in back of neck, wheezing, headache, small cough  symptoms started 1 week ago.         Has not been taking any meds for symptoms.

## 2025-04-21 NOTE — ED PROVIDER NOTES
[facility] URGENT CARE  Urgent Care Encounter       CHIEF COMPLAINT       Chief Complaint   Patient presents with    sinus congestion       Nurses Notes reviewed and I agree except as noted in the HPI.  HISTORY OF PRESENT ILLNESS   Alejandro Lizama is a 49 y.o. male who presents for complaints of sinus congestion and pressure, headache, also reports he is sneezing and sometimes has rhinorrhea.  Occasional cough.  No fever.  Symptoms x 4 days.  Patient has taken Tylenol but no antihistamines or decongestants for treatment.    HPI    REVIEW OF SYSTEMS     Review of Systems   Constitutional:  Negative for activity change, fatigue and fever.   HENT:  Positive for congestion, rhinorrhea, sinus pressure and sneezing. Negative for ear pain.    Respiratory:  Positive for cough. Negative for shortness of breath and wheezing.    Cardiovascular:  Negative for chest pain.       PAST MEDICAL HISTORY         Diagnosis Date    Seasonal allergies        SURGICALHISTORY     Patient  has a past surgical history that includes Arm Surgery (Left).    CURRENT MEDICATIONS       Discharge Medication List as of 4/21/2025  2:17 PM          ALLERGIES     Patient is is allergic to pollen extract and tobacco.    Patients   Immunization History   Administered Date(s) Administered    TDaP, ADACEL (age 10y-64y), BOOSTRIX (age 10y+), IM, 0.5mL 01/02/2020       FAMILY HISTORY     Patient's family history is not on file.    SOCIAL HISTORY     Patient  reports that he has been smoking cigarettes. He has never used smokeless tobacco. He reports that he does not drink alcohol and does not use drugs.    PHYSICAL EXAM     ED TRIAGE VITALS  BP: 122/79, Temp: 98.3 °F (36.8 °C), Pulse: 81, Respirations: 16, SpO2: 97 %,Estimated body mass index is 25.99 kg/m² as calculated from the following:    Height as of 10/13/23: 1.753 m (5' 9\").    Weight as of this encounter: 79.8 kg (176 lb).,No LMP for male patient.    Physical Exam  Constitutional:

## 2025-08-31 ENCOUNTER — HOSPITAL ENCOUNTER (EMERGENCY)
Age: 50
Discharge: HOME | End: 2025-08-31
Payer: MEDICAID

## 2025-08-31 VITALS
DIASTOLIC BLOOD PRESSURE: 86 MMHG | HEART RATE: 103 BPM | WEIGHT: 175 LBS | TEMPERATURE: 97.9 F | HEIGHT: 69 IN | OXYGEN SATURATION: 97 % | SYSTOLIC BLOOD PRESSURE: 127 MMHG | RESPIRATION RATE: 20 BRPM | BODY MASS INDEX: 25.92 KG/M2

## 2025-08-31 DIAGNOSIS — J40 BRONCHITIS: Primary | ICD-10-CM

## 2025-08-31 LAB — SARS-COV-2 RDRP RESP QL NAA+PROBE: NOT  DETECTED

## 2025-08-31 PROCEDURE — 87635 SARS-COV-2 COVID-19 AMP PRB: CPT

## 2025-08-31 PROCEDURE — 99213 OFFICE O/P EST LOW 20 MIN: CPT

## 2025-08-31 RX ORDER — BENZONATATE 200 MG/1
200 CAPSULE ORAL 3 TIMES DAILY PRN
Qty: 30 CAPSULE | Refills: 0 | Status: SHIPPED | OUTPATIENT
Start: 2025-08-31 | End: 2025-09-10

## 2025-08-31 RX ORDER — GUAIFENESIN 600 MG/1
600 TABLET, EXTENDED RELEASE ORAL 2 TIMES DAILY
Qty: 30 TABLET | Refills: 0 | Status: SHIPPED | OUTPATIENT
Start: 2025-08-31 | End: 2025-09-02

## 2025-08-31 RX ORDER — DEXTROMETHORPHAN HYDROBROMIDE AND PROMETHAZINE HYDROCHLORIDE 15; 6.25 MG/5ML; MG/5ML
5 SYRUP ORAL 4 TIMES DAILY PRN
Qty: 118 ML | Refills: 0 | Status: SHIPPED | OUTPATIENT
Start: 2025-08-31 | End: 2025-09-02

## 2025-08-31 RX ORDER — PREDNISONE 20 MG/1
20 TABLET ORAL 2 TIMES DAILY
Qty: 10 TABLET | Refills: 0 | Status: SHIPPED | OUTPATIENT
Start: 2025-08-31 | End: 2025-09-05

## 2025-08-31 ASSESSMENT — PAIN SCALES - GENERAL: PAINLEVEL_OUTOF10: 0

## 2025-08-31 ASSESSMENT — PAIN - FUNCTIONAL ASSESSMENT: PAIN_FUNCTIONAL_ASSESSMENT: 0-10

## 2025-09-02 ENCOUNTER — HOSPITAL ENCOUNTER (EMERGENCY)
Age: 50
Discharge: HOME | End: 2025-09-02
Payer: MEDICAID

## 2025-09-02 VITALS
DIASTOLIC BLOOD PRESSURE: 91 MMHG | TEMPERATURE: 98.1 F | OXYGEN SATURATION: 97 % | RESPIRATION RATE: 18 BRPM | HEART RATE: 83 BPM | SYSTOLIC BLOOD PRESSURE: 157 MMHG | WEIGHT: 175 LBS | BODY MASS INDEX: 25.84 KG/M2

## 2025-09-02 DIAGNOSIS — J20.9 ACUTE BRONCHITIS, UNSPECIFIED ORGANISM: Primary | ICD-10-CM

## 2025-09-02 PROCEDURE — 99213 OFFICE O/P EST LOW 20 MIN: CPT

## 2025-09-02 RX ORDER — ALBUTEROL SULFATE 90 UG/1
2 INHALANT RESPIRATORY (INHALATION) 4 TIMES DAILY PRN
Qty: 18 G | Refills: 0 | Status: SHIPPED | OUTPATIENT
Start: 2025-09-02

## 2025-09-02 RX ORDER — BROMPHENIRAMINE MALEATE, PSEUDOEPHEDRINE HYDROCHLORIDE, AND DEXTROMETHORPHAN HYDROBROMIDE 2; 30; 10 MG/5ML; MG/5ML; MG/5ML
5 SYRUP ORAL 4 TIMES DAILY PRN
Qty: 118 ML | Refills: 0 | Status: SHIPPED | OUTPATIENT
Start: 2025-09-02 | End: 2025-09-08

## 2025-09-02 ASSESSMENT — ENCOUNTER SYMPTOMS
GASTROINTESTINAL NEGATIVE: 1
ALLERGIC/IMMUNOLOGIC NEGATIVE: 1
COUGH: 1
EYES NEGATIVE: 1

## 2025-09-02 ASSESSMENT — PAIN SCALES - GENERAL: PAINLEVEL_OUTOF10: 0

## 2025-09-02 ASSESSMENT — PAIN - FUNCTIONAL ASSESSMENT: PAIN_FUNCTIONAL_ASSESSMENT: 0-10
